# Patient Record
Sex: MALE | Race: WHITE
[De-identification: names, ages, dates, MRNs, and addresses within clinical notes are randomized per-mention and may not be internally consistent; named-entity substitution may affect disease eponyms.]

---

## 2020-07-27 ENCOUNTER — HOSPITAL ENCOUNTER (EMERGENCY)
Dept: HOSPITAL 41 - JD.ED | Age: 30
Discharge: HOME | End: 2020-07-27
Payer: SELF-PAY

## 2020-07-27 VITALS — SYSTOLIC BLOOD PRESSURE: 122 MMHG | HEART RATE: 80 BPM | DIASTOLIC BLOOD PRESSURE: 77 MMHG

## 2020-07-27 DIAGNOSIS — M54.5: Primary | ICD-10-CM

## 2020-07-27 PROCEDURE — 96372 THER/PROPH/DIAG INJ SC/IM: CPT

## 2020-07-27 PROCEDURE — 99284 EMERGENCY DEPT VISIT MOD MDM: CPT

## 2020-07-27 PROCEDURE — 72100 X-RAY EXAM L-S SPINE 2/3 VWS: CPT

## 2020-07-27 NOTE — EDM.PDOC
<Micah Fabian DARYA - Last Filed: 07/27/20 12:21>





ED HPI GENERAL MEDICAL PROBLEM





- General


Chief Complaint: Back Pain or Injury


Stated Complaint: [facility]


Time Seen by Provider: 07/27/20 11:41


Source of Information: Reports: Patient


History Limitations: Reports: No Limitations





- History of Present Illness


INITIAL COMMENTS - FREE TEXT/NARRATIVE: 


Mick is a 30 YO male that presents to the ED with low back pain. He was 

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 

immediate pain just above the pelvis. Pain has been constant. The pain is 

described as a dull ache that is rated as minimal when sitting. When standing, 

it is described as a sharp, stabbing sensation and rated at a 10 out of 10. 

Denies pain radiating to other locations, numbness, tingling, radicular pain, 

loss of range of motion in lower extremities, urinary or fecal incontinence. Has

taken Alieve at 0800 which did not provide relief. 





Onset Date: 07/26/20


Onset Time: 13:00


Duration: Day(s):


Location: Reports: Back


Quality: Reports: Ache, Sharp, Stabbing


Improves with: Reports: Rest


Worsens with: Reports: Movement


Treatments PTA: Reports: Other (see below) (Naproxen)


  ** Lower Back


Pain Score (Numeric/FACES): 6





- Related Data


                                    Allergies











Allergy/AdvReac Type Severity Reaction Status Date / Time


 


No Known Allergies Allergy   Verified 07/27/20 11:38











Home Meds: 


                                    Home Meds





Orphenadrine [Norflex] 100 mg PO BID PRN #20 tab 07/27/20 [Rx]


traMADol [Ultram] 50 mg PO Q6H PRN #20 tab 07/27/20 [Rx]











Past Medical History





- Past Health History


Medical/Surgical History: Denies Medical/Surgical History


Musculoskeletal History: Reports: Back Pain, Chronic





Social & Family History





- Family History


Family Medical History: Noncontributory





- Tobacco Use


Smoking Status *Q: Never Smoker





- Caffeine Use


Caffeine Use: Reports: Soda





- Recreational Drug Use


Recreational Drug Use: No





ED ROS GENERAL





- Review of Systems


Review Of Systems: See Below


Musculoskeletal: Reports: Back Pain (Located approximately L5/S1.), Muscle Pain,

 Muscle Stiffness.  Denies: Leg Pain, Foot Pain


Neurological: Reports: Gait Disturbance (Difficulty walking due to pain.).  

Denies: Numbness, Paresthesia, Tingling





ED EXAM,LOWER BACK PAIN/INJURY





- Physical Exam


Exam: See Below


Exam Limited By: Physical Impairment


General Appearance: Alert, No Apparent Distress


Respiratory/Chest: No Respiratory Distress, Lungs Clear, Normal Breath Sounds, 

No Accessory Muscle Use, Chest Non-Tender


Cardiovascular: Regular Rate, Rhythm, No Gallop, No Murmur, No Rub


Back Exam: Decreased Range of Motion, Paraspinal Tenderness, Vertebral 

Tenderness


Extremities: No: Leg Pain, Limited Range of Motion


Neurological: Alert, Normal Mood/Affect, Normal Dorsiflexion, Normal Plantar 

Flexion, No Motor/Sensory Deficits


Skin Exam: Warm, Dry, Normal Color





Departure





- Departure


Disposition: Home, Self-Care 01


Clinical Impression: 


Low back pain


Qualifiers:


 Chronicity: acute Back pain laterality: bilateral Sciatica presence: without 

sciatica Qualified Code(s): M54.5 - Low back pain








- Discharge Information


Prescriptions: 


Orphenadrine [Norflex] 100 mg PO BID PRN #20 tab


 PRN Reason: Spasms


traMADol [Ultram] 50 mg PO Q6H PRN #20 tab


 PRN Reason: Pain


Instructions:  Back Exercises, Easy-to-Read


Referrals: 


PCP,None [Primary Care Provider] - 


Forms:  ED Department Discharge


Additional Instructions: 


You have been evaluated in the ED for your lower back injury/pain.





Your x-ray demonstrated no acute fracture or other bony abnormalities.  You were

given a CD of these images, so you may show chiropractors if they should request

these.  These are also beneficial for baseline studies, if your back pain does n

ot get much better with conservative management.





Please use ice/heat as tolerated to the affected area.





You may take Tylenol 500 mg or ibuprofen 600mg q6 hrs for pain relief. Please do

so until you have a tolerable level of pain with activity. Do not exceed 4000mg 

Tylenol or 3200mg ibuprofen in a 24 hour time period.





Were given a prescription for tramadol, 1 tablet every 6 hours as needed for p

ain relief that is not relieved by Tylenol or ibuprofen alone.  This medication 

can be addictive, so take as few of these as possible to provide pain relief.  

This medication can also cause some constipation, recommend you take some 

MiraLAX while using this medication to help keep your bowels regular.  Do not 

drive while taking this medication.





You are also given a muscle relaxer called Norflex, 1 tab every 12 hours as 

needed for further muscle spasms.





If you do not already have a primary care provider, please call our clinic 

[phone] and set up with a family practice provider of your choice.





Please return to ED if your symptoms should change or worsen.








Sepsis Event Note (ED)





- Evaluation


Sepsis Screening Result: No Definite Risk





<Stephanie Blankenship - Last Filed: 07/27/20 14:08>





Course





- Vital Signs


Last Recorded V/S: 


                                Last Vital Signs











Temp  98.7 F   07/27/20 11:35


 


Pulse  91   07/27/20 11:35


 


Resp  18   07/27/20 11:35


 


BP  115/71   07/27/20 11:35


 


Pulse Ox  98   07/27/20 11:35














- Orders/Labs/Meds


Meds: 


Medications














Discontinued Medications














Generic Name Dose Route Start Last Admin





  Trade Name Freq  PRN Reason Stop Dose Admin


 


Ketorolac Tromethamine  60 mg  07/27/20 12:09  07/27/20 12:14





  Toradol  IM  07/27/20 12:10  60 mg





  ONETIME ONE   Administration


 


Orphenadrine Citrate  100 mg  07/27/20 12:09  07/27/20 12:15





  Norflex  PO  07/27/20 12:10  100 mg





  ONETIME ONE   Administration


 


Tramadol HCl  50 mg  07/27/20 12:51  07/27/20 13:15





  Ultram  PO  07/27/20 12:52  50 mg





  ONETIME ONE   Administration














- Re-Assessments/Exams


Free Text/Narrative Re-Assessment/Exam: 





07/27/20 12:20


I have read and reviewed the student's HPI and examined the patient and agree 

with HOLDEN Alvarado-student.  We did order lumbar films, and 60 mg IM Toradol, 

and 100 mg p.o. Norflex for initial management.





07/27/20 14:07


Patient notes that he is feeling much better after the tramadol, patient be 

discharged home with a short course of this, and some general recommendations.





Departure





- Departure


Time of Disposition: 14:07


Condition: Good





- Discharge Information


*PRESCRIPTION DRUG MONITORING PROGRAM REVIEWED*: Yes


*COPY OF PRESCRIPTION DRUG MONITORING REPORT IN PATIENT ALICIA: No





Sepsis Event Note (ED)





- Focused Exam


Vital Signs: 


                                   Vital Signs











  Temp Pulse Resp BP Pulse Ox


 


 07/27/20 11:35  98.7 F  91  18  115/71  98

## 2020-07-27 NOTE — CR
Lumbar spine: AP and lateral views lumbar spine were obtained.

 

Comparison: No previous study.

 

Vertebral body heights and disc spaces are maintained.  Pedicles are 

intact.  No fracture or subluxation is seen.

 

Impression:

1.  No abnormality is appreciated on 2 view lumbar spine study.

 

Diagnostic code #1

 

This report was dictated in MDT